# Patient Record
Sex: FEMALE | Race: WHITE
[De-identification: names, ages, dates, MRNs, and addresses within clinical notes are randomized per-mention and may not be internally consistent; named-entity substitution may affect disease eponyms.]

---

## 2021-05-06 ENCOUNTER — HOSPITAL ENCOUNTER (EMERGENCY)
Dept: HOSPITAL 8 - ED | Age: 26
Discharge: HOME | End: 2021-05-06
Payer: COMMERCIAL

## 2021-05-06 VITALS — DIASTOLIC BLOOD PRESSURE: 78 MMHG | SYSTOLIC BLOOD PRESSURE: 131 MMHG

## 2021-05-06 VITALS — WEIGHT: 256.84 LBS | HEIGHT: 68 IN | BODY MASS INDEX: 38.93 KG/M2

## 2021-05-06 DIAGNOSIS — R10.11: Primary | ICD-10-CM

## 2021-05-06 DIAGNOSIS — R11.0: ICD-10-CM

## 2021-05-06 DIAGNOSIS — R19.7: ICD-10-CM

## 2021-05-06 LAB
ALBUMIN SERPL-MCNC: 3.9 G/DL (ref 3.4–5)
ALBUMIN SERPL-MCNC: 3.9 G/DL (ref 3.4–5)
ALP SERPL-CCNC: 66 U/L (ref 45–117)
ALT SERPL-CCNC: 66 U/L (ref 12–78)
ANION GAP SERPL CALC-SCNC: 5 MMOL/L (ref 5–15)
BASOPHILS # BLD AUTO: 0.1 X10^3/UL (ref 0–0.1)
BASOPHILS NFR BLD AUTO: 1 % (ref 0–1)
BILIRUB SERPL-MCNC: 0.4 MG/DL (ref 0.2–1)
CALCIUM SERPL-MCNC: 8.9 MG/DL (ref 8.5–10.1)
CHLORIDE SERPL-SCNC: 111 MMOL/L (ref 98–107)
CREAT SERPL-MCNC: 0.83 MG/DL (ref 0.55–1.02)
EOSINOPHIL # BLD AUTO: 0.2 X10^3/UL (ref 0–0.4)
EOSINOPHIL NFR BLD AUTO: 2 % (ref 1–7)
ERYTHROCYTE [DISTWIDTH] IN BLOOD BY AUTOMATED COUNT: 12.7 % (ref 9.6–15.2)
LYMPHOCYTES # BLD AUTO: 2.4 X10^3/UL (ref 1–3.4)
LYMPHOCYTES NFR BLD AUTO: 33 % (ref 22–44)
MCH RBC QN AUTO: 28.7 PG (ref 27–34.8)
MCHC RBC AUTO-ENTMCNC: 33.5 G/DL (ref 32.4–35.8)
MD: NO
MICROSCOPIC: (no result)
MONOCYTES # BLD AUTO: 0.5 X10^3/UL (ref 0.2–0.8)
MONOCYTES NFR BLD AUTO: 7 % (ref 2–9)
NEUTROPHILS # BLD AUTO: 4.2 X10^3/UL (ref 1.8–6.8)
NEUTROPHILS NFR BLD AUTO: 57 % (ref 42–75)
PLATELET # BLD AUTO: 272 X10^3/UL (ref 130–400)
PMV BLD AUTO: 9.2 FL (ref 7.4–10.4)
PROT SERPL-MCNC: 7.4 G/DL (ref 6.4–8.2)
RBC # BLD AUTO: 4.88 X10^6/UL (ref 3.82–5.3)

## 2021-05-06 PROCEDURE — 72072 X-RAY EXAM THORAC SPINE 3VWS: CPT

## 2021-05-06 PROCEDURE — 80048 BASIC METABOLIC PNL TOTAL CA: CPT

## 2021-05-06 PROCEDURE — 76700 US EXAM ABDOM COMPLETE: CPT

## 2021-05-06 PROCEDURE — 81003 URINALYSIS AUTO W/O SCOPE: CPT

## 2021-05-06 PROCEDURE — 85025 COMPLETE CBC W/AUTO DIFF WBC: CPT

## 2021-05-06 PROCEDURE — 82040 ASSAY OF SERUM ALBUMIN: CPT

## 2021-05-06 PROCEDURE — 36415 COLL VENOUS BLD VENIPUNCTURE: CPT

## 2021-05-06 PROCEDURE — 80076 HEPATIC FUNCTION PANEL: CPT

## 2021-05-06 PROCEDURE — 99285 EMERGENCY DEPT VISIT HI MDM: CPT

## 2021-05-06 PROCEDURE — 74176 CT ABD & PELVIS W/O CONTRAST: CPT

## 2021-05-06 PROCEDURE — 84703 CHORIONIC GONADOTROPIN ASSAY: CPT

## 2021-05-06 NOTE — NUR
PT RESTING IN ROOM. VS STABLE NO ACUTE DISTRESS NOTED. CALL LIGHT IN PLACE. 
WILL CONTINUE TO MONITOR.

## 2021-05-06 NOTE — NUR
ASSUMED CARE OF PATIENT PATIENT REPORTS RIGHT UPPER BACK PAIN THAT GOES TO 
RIGHT RIBS. VS STABLE. FAMILY AT BEDSIDE. NO ACUTE DISTRESS NOTED. CALL LIGHT 
IN PLACE. WILL CONTINUE TO MONITOR.